# Patient Record
Sex: MALE | URBAN - METROPOLITAN AREA
[De-identification: names, ages, dates, MRNs, and addresses within clinical notes are randomized per-mention and may not be internally consistent; named-entity substitution may affect disease eponyms.]

---

## 2017-03-21 ENCOUNTER — IMPORTED ENCOUNTER (OUTPATIENT)
Dept: URBAN - METROPOLITAN AREA CLINIC 9 | Facility: CLINIC | Age: 67
End: 2017-03-21

## 2019-06-18 ENCOUNTER — IMPORTED ENCOUNTER (OUTPATIENT)
Dept: URBAN - METROPOLITAN AREA CLINIC 9 | Facility: CLINIC | Age: 69
End: 2019-06-18

## 2021-02-02 ENCOUNTER — IMPORTED ENCOUNTER (OUTPATIENT)
Dept: URBAN - METROPOLITAN AREA CLINIC 9 | Facility: CLINIC | Age: 71
End: 2021-02-02

## 2021-02-02 PROBLEM — H52.13: Noted: 2021-02-02

## 2021-10-19 ASSESSMENT — VISUAL ACUITY
OS_CC: 20/25 + SN
OS_CC: 20/20 SN
OD_CC: 20/20 SN
OD_CC: 20/30 SN
OD_CC: 20/20 - SN
OS_CC: 20/20 - SN
OD_CC: 20/30 + SN
OS_CC: 20/25 - SN
OS_CC: 20/20 SN
OD_CC: 20/30 SN

## 2021-10-19 ASSESSMENT — TONOMETRY
OS_IOP_MMHG: 11
OS_IOP_MMHG: 11
OD_IOP_MMHG: 11
OD_IOP_MMHG: 10
OD_IOP_MMHG: 10
OS_IOP_MMHG: 9

## 2022-06-14 ENCOUNTER — ESTABLISHED PATIENT (OUTPATIENT)
Dept: URBAN - METROPOLITAN AREA CLINIC 17 | Facility: CLINIC | Age: 72
End: 2022-06-14

## 2022-06-14 DIAGNOSIS — H35.3121: ICD-10-CM

## 2022-06-14 DIAGNOSIS — H52.13: ICD-10-CM

## 2022-06-14 DIAGNOSIS — H25.013: ICD-10-CM

## 2022-06-14 PROCEDURE — 92134 CPTRZ OPH DX IMG PST SGM RTA: CPT

## 2022-06-14 PROCEDURE — 92014 COMPRE OPH EXAM EST PT 1/>: CPT

## 2022-06-14 PROCEDURE — 92015 DETERMINE REFRACTIVE STATE: CPT

## 2022-06-14 ASSESSMENT — VISUAL ACUITY
OD_CC: 20/20
OS_CC: 20/20

## 2022-06-14 ASSESSMENT — TONOMETRY
OD_IOP_MMHG: 10
OS_IOP_MMHG: 11

## 2023-06-19 ENCOUNTER — ESTABLISHED PATIENT (OUTPATIENT)
Dept: URBAN - METROPOLITAN AREA CLINIC 17 | Facility: CLINIC | Age: 73
End: 2023-06-19

## 2023-06-19 DIAGNOSIS — H35.3121: ICD-10-CM

## 2023-06-19 DIAGNOSIS — H25.013: ICD-10-CM

## 2023-06-19 DIAGNOSIS — H43.811: ICD-10-CM

## 2023-06-19 PROCEDURE — 92134 CPTRZ OPH DX IMG PST SGM RTA: CPT

## 2023-06-19 PROCEDURE — 92014 COMPRE OPH EXAM EST PT 1/>: CPT

## 2023-06-19 ASSESSMENT — TONOMETRY
OS_IOP_MMHG: 13
OD_IOP_MMHG: 13

## 2023-06-19 ASSESSMENT — VISUAL ACUITY
OD_CC: 20/20
OS_CC: 20/20

## 2023-12-19 ENCOUNTER — ESTABLISHED PATIENT (OUTPATIENT)
Dept: URBAN - METROPOLITAN AREA CLINIC 17 | Facility: CLINIC | Age: 73
End: 2023-12-19

## 2023-12-19 DIAGNOSIS — H43.811: ICD-10-CM

## 2023-12-19 DIAGNOSIS — H25.13: ICD-10-CM

## 2023-12-19 DIAGNOSIS — H35.3121: ICD-10-CM

## 2023-12-19 DIAGNOSIS — H25.013: ICD-10-CM

## 2023-12-19 PROCEDURE — 92014 COMPRE OPH EXAM EST PT 1/>: CPT

## 2023-12-19 PROCEDURE — 92134 CPTRZ OPH DX IMG PST SGM RTA: CPT

## 2023-12-19 ASSESSMENT — TONOMETRY
OD_IOP_MMHG: 17
OS_IOP_MMHG: 15

## 2023-12-19 ASSESSMENT — VISUAL ACUITY
OD_CC: 20/20-1
OS_CC: 20/20

## 2025-02-25 ENCOUNTER — COMPREHENSIVE EXAM (OUTPATIENT)
Age: 75
End: 2025-02-25

## 2025-02-25 DIAGNOSIS — H25.13: ICD-10-CM

## 2025-02-25 DIAGNOSIS — H43.811: ICD-10-CM

## 2025-02-25 DIAGNOSIS — H35.3121: ICD-10-CM

## 2025-02-25 PROCEDURE — 92134 CPTRZ OPH DX IMG PST SGM RTA: CPT

## 2025-02-25 PROCEDURE — 92015 DETERMINE REFRACTIVE STATE: CPT

## 2025-02-25 PROCEDURE — 99214 OFFICE O/P EST MOD 30 MIN: CPT

## 2025-03-04 ENCOUNTER — COMPREHENSIVE EXAM (OUTPATIENT)
Age: 75
End: 2025-03-04

## 2025-03-04 DIAGNOSIS — H43.811: ICD-10-CM

## 2025-03-04 DIAGNOSIS — H01.021: ICD-10-CM

## 2025-03-04 DIAGNOSIS — H35.3121: ICD-10-CM

## 2025-03-04 DIAGNOSIS — H25.13: ICD-10-CM

## 2025-03-04 DIAGNOSIS — H25.013: ICD-10-CM

## 2025-03-04 DIAGNOSIS — H01.024: ICD-10-CM

## 2025-03-04 PROCEDURE — 99214 OFFICE O/P EST MOD 30 MIN: CPT

## 2025-03-04 PROCEDURE — 92136 OPHTHALMIC BIOMETRY: CPT

## 2025-03-20 ENCOUNTER — SURGERY/PROCEDURE (OUTPATIENT)
Age: 75
End: 2025-03-20

## 2025-03-20 DIAGNOSIS — H25.13: ICD-10-CM

## 2025-03-20 PROCEDURE — 66984AV REMOVE CATARACT, INSERT ADVANCED LENS

## 2025-03-20 PROCEDURE — 99199PAV PROF ADVANCED VISION PACKAGE

## 2025-03-21 ENCOUNTER — POST-OP (OUTPATIENT)
Age: 75
End: 2025-03-21

## 2025-03-21 DIAGNOSIS — Z96.1: ICD-10-CM

## 2025-03-21 PROCEDURE — 99024 POSTOP FOLLOW-UP VISIT: CPT

## 2025-03-24 ENCOUNTER — POST OP/EVAL OF SECOND EYE (OUTPATIENT)
Age: 75
End: 2025-03-24

## 2025-03-24 DIAGNOSIS — Z96.1: ICD-10-CM

## 2025-03-24 PROCEDURE — 99024 POSTOP FOLLOW-UP VISIT: CPT

## 2025-04-03 ENCOUNTER — SURGERY/PROCEDURE (OUTPATIENT)
Age: 75
End: 2025-04-03

## 2025-04-03 DIAGNOSIS — H25.13: ICD-10-CM

## 2025-04-03 PROCEDURE — 99199PAV PROF ADVANCED VISION PACKAGE

## 2025-04-03 PROCEDURE — 66984AV REMOVE CATARACT, INSERT ADVANCED LENS: Mod: 79,LT

## 2025-04-04 ENCOUNTER — POST-OP (OUTPATIENT)
Age: 75
End: 2025-04-04

## 2025-04-04 DIAGNOSIS — Z96.1: ICD-10-CM

## 2025-04-04 PROCEDURE — P6698455 NON-COMANAGED ADVANCED PO

## 2025-04-21 ENCOUNTER — POST-OP (OUTPATIENT)
Age: 75
End: 2025-04-21

## 2025-04-21 DIAGNOSIS — Z96.1: ICD-10-CM

## 2025-04-21 PROCEDURE — P6698455 NON-COMANAGED ADVANCED PO

## 2025-05-13 ENCOUNTER — FOLLOW UP (OUTPATIENT)
Age: 75
End: 2025-05-13

## 2025-05-13 DIAGNOSIS — Z96.1: ICD-10-CM

## 2025-05-13 PROCEDURE — P6698455 NON-COMANAGED ADVANCED PO: Mod: 24
